# Patient Record
(demographics unavailable — no encounter records)

---

## 2025-02-20 NOTE — DISCUSSION/SUMMARY
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 Mccormick or Fabian in 4 weeks

## 2025-02-20 NOTE — DATA REVIEWED
[FreeTextEntry1] : 3 v r elbow mild elbow arthrosis neg for fx or dislocaiton  2 v R forearm neg for fx or dislocation

## 2025-02-20 NOTE — IMAGING
[de-identified] : RIGHT ELBOW  Inspection: No Swelling, No erythema   Palpation: Tenderness to latal aspect of elbow    Range of Motion: Full elbow flexion and elbow extension with pain.   Strength: flexion strength 4/5, extension strength 4/5, pronation strength 4/5 and supination strength 4/5   Neurological testing: motor exam 5/5 and light touch intact.   Ligament Stability and Special Testing:  No varus or valgus instability

## 2025-02-20 NOTE — HISTORY OF PRESENT ILLNESS
[de-identified] : 02/20/2025 DYLAN BARNHART 59 year male Here for evaluation of right elbow pain. patient denies injury, states he started having pain in right elbow sometime in 1/25.  no prior treatment. says sometimes pain radiates from right shoulder to elbow and wrist. Pain indicated to lateral aspect of elbow radiating should and forearm, 8/10, constant, achy and sharp at times. worsening with arm movement to point affecting quality of life and some adl's. Some relief with otc nsaids.   PMHx right shoulder Sx (2009) due to trauma, HTN,